# Patient Record
Sex: MALE | Race: WHITE | NOT HISPANIC OR LATINO | URBAN - METROPOLITAN AREA
[De-identification: names, ages, dates, MRNs, and addresses within clinical notes are randomized per-mention and may not be internally consistent; named-entity substitution may affect disease eponyms.]

---

## 2024-12-06 ENCOUNTER — OFFICE VISIT (OUTPATIENT)
Dept: URGENT CARE | Age: 44
End: 2024-12-06
Payer: COMMERCIAL

## 2024-12-06 VITALS
SYSTOLIC BLOOD PRESSURE: 112 MMHG | DIASTOLIC BLOOD PRESSURE: 75 MMHG | TEMPERATURE: 102.3 F | HEART RATE: 98 BPM | OXYGEN SATURATION: 96 %

## 2024-12-06 DIAGNOSIS — J18.9 PNEUMONIA OF RIGHT LOWER LOBE DUE TO INFECTIOUS ORGANISM: Primary | ICD-10-CM

## 2024-12-06 DIAGNOSIS — J18.9 PNEUMONIA OF RIGHT LOWER LOBE DUE TO INFECTIOUS ORGANISM: ICD-10-CM

## 2024-12-06 RX ORDER — AZITHROMYCIN 250 MG/1
TABLET, FILM COATED ORAL
Qty: 6 TABLET | Refills: 0 | Status: SHIPPED | OUTPATIENT
Start: 2024-12-06 | End: 2024-12-06

## 2024-12-06 RX ORDER — AZITHROMYCIN 250 MG/1
TABLET, FILM COATED ORAL
Qty: 6 TABLET | Refills: 0 | Status: SHIPPED | OUTPATIENT
Start: 2024-12-06 | End: 2024-12-11

## 2024-12-06 RX ORDER — CYCLOBENZAPRINE HCL 10 MG
10 TABLET ORAL 3 TIMES DAILY PRN
Qty: 30 TABLET | Refills: 0 | Status: SHIPPED | OUTPATIENT
Start: 2024-12-06 | End: 2024-12-06 | Stop reason: SDUPTHER

## 2024-12-06 RX ORDER — CYCLOBENZAPRINE HCL 10 MG
10 TABLET ORAL 3 TIMES DAILY PRN
Qty: 30 TABLET | Refills: 0 | Status: SHIPPED | OUTPATIENT
Start: 2024-12-06 | End: 2024-12-16

## 2024-12-06 RX ORDER — BENZONATATE 200 MG/1
200 CAPSULE ORAL 3 TIMES DAILY PRN
Qty: 42 CAPSULE | Refills: 0 | Status: SHIPPED | OUTPATIENT
Start: 2024-12-06 | End: 2025-01-05

## 2024-12-06 NOTE — PROGRESS NOTES
Subjective   Patient ID: Dennis Davis is a 44 y.o. male. They present today with a chief complaint of Cough (Fatigue , lower back an legs hurt).    History of Present Illness  43 yo male coming in for fatigue, lower back pain, leg aching, and cough. He states today he has a fever as well. He denies any ear pain or congestion. He denies any other complaints.     Past Medical History  Allergies as of 12/06/2024 - Reviewed 12/06/2024   Allergen Reaction Noted    Penicillins GI Upset 10/12/2012       (Not in a hospital admission)       History reviewed. No pertinent past medical history.    History reviewed. No pertinent surgical history.         Review of Systems  Review of Systems:  General: No weight loss, fatigue, anorexia, insomnia, positive fever, chills.  ENT: No pharyngitis, dry mouth, nasal congestion, ear pain  Cardiac: No chest pain, palpitations, syncope, near syncope.  Pulmonary:  No shortness of breath,  positive cough, no hemoptysis  Heme/lymph: No swollen glands, fever, bleeding  Musculoskeletal: No limb pain, joint pain, joint swelling.  Skin: No rashes  Neuro: No numbness, tingling, headaches                                 Objective    Vitals:    12/06/24 1303   BP: 112/75   Pulse: 98   Temp: (!) 39.1 °C (102.3 °F)   SpO2: 96%     No LMP for male patient.    Physical Exam  Physical Exam:  General: Vital noted, no distress. Afebrile  EENT: Eyes unremarkable, Pupils PERRLA, EOMs intact. TMs unremarkable. Posterior oropharynx unremarkable. Uvula in the midline and non-edematous. No PTA. No retropharyngeal mass. No Christofer's angina.  Cardiac: Regular rate and rhythm, no murmur  Pulmonary: Lungs with rhonchi in the right lower lobe, remainder of lung fields  clear bilaterally with good aeration. No adventitious breath sounds.  Skin: No rashes  Neuro: No focal neurologic deficits    Procedures    Point of Care Test & Imaging Results from this visit  No results found for this visit on 12/06/24.   No  results found.    Diagnostic study results (if any) were reviewed by DARIELA Crowell.    Assessment/Plan   Allergies, medications, history, and pertinent labs/EKGs/Imaging reviewed by DARIELA Crowell.     Medical Decision Making  Treatment: Zithromax, tessalon, and flexeril prescribed  Differential: 1) pneumonia, 2) bronchitis, 3) uri  Plan: Patient will follow up with the PCP in the next 2-3 days. Return for any worsening symptoms or go to the ER for further evaluation. Patient understands return precautions and discharge insturctions.  Impression:   1) pneumonia      Orders and Diagnoses  Diagnoses and all orders for this visit:  Pneumonia of right lower lobe due to infectious organism  -     azithromycin (Zithromax) 250 mg tablet; Take 2 tabs (500 mg) by mouth today, than 1 daily for 4 days.  -     benzonatate (Tessalon) 200 mg capsule; Take 1 capsule (200 mg) by mouth 3 times a day as needed for cough. Do not crush or chew.  -     cyclobenzaprine (Flexeril) 10 mg tablet; Take 1 tablet (10 mg) by mouth 3 times a day as needed for muscle spasms for up to 10 days.      Medical Admin Record      Patient disposition: Home    Electronically signed by DARIELA Crowell  1:27 PM

## 2025-06-11 ENCOUNTER — OFFICE VISIT (OUTPATIENT)
Dept: URGENT CARE | Age: 45
End: 2025-06-11
Payer: COMMERCIAL

## 2025-06-11 VITALS
SYSTOLIC BLOOD PRESSURE: 117 MMHG | OXYGEN SATURATION: 99 % | WEIGHT: 235 LBS | BODY MASS INDEX: 32.78 KG/M2 | TEMPERATURE: 98 F | DIASTOLIC BLOOD PRESSURE: 81 MMHG | RESPIRATION RATE: 18 BRPM | HEART RATE: 80 BPM

## 2025-06-11 DIAGNOSIS — H60.502 ACUTE OTITIS EXTERNA OF LEFT EAR, UNSPECIFIED TYPE: Primary | ICD-10-CM

## 2025-06-11 PROCEDURE — 1036F TOBACCO NON-USER: CPT

## 2025-06-11 PROCEDURE — 99213 OFFICE O/P EST LOW 20 MIN: CPT

## 2025-06-11 RX ORDER — CIPROFLOXACIN AND DEXAMETHASONE 3; 1 MG/ML; MG/ML
4 SUSPENSION/ DROPS AURICULAR (OTIC) 2 TIMES DAILY
Qty: 7.5 ML | Refills: 0 | Status: SHIPPED | OUTPATIENT
Start: 2025-06-11 | End: 2025-06-18

## 2025-06-11 ASSESSMENT — PATIENT HEALTH QUESTIONNAIRE - PHQ9
SUM OF ALL RESPONSES TO PHQ9 QUESTIONS 1 AND 2: 0
2. FEELING DOWN, DEPRESSED OR HOPELESS: NOT AT ALL
1. LITTLE INTEREST OR PLEASURE IN DOING THINGS: NOT AT ALL

## 2025-06-11 ASSESSMENT — ENCOUNTER SYMPTOMS: CONSTITUTIONAL NEGATIVE: 1

## 2025-06-11 NOTE — PROGRESS NOTES
Subjective   Patient ID: Dennis Davis is a 44 y.o. male. They present today with a chief complaint of Earache (Ear pain  since Friday).    History of Present Illness  44-year-old male presents to clinic today with complaints of left ear pain.  Patient states been ongoing for 3 to 4 days.  Patient states that he began wearing inner earplugs over the last 1 month because he is a .  He is unsure if it is related.  He has noted swelling to the ear.  The ear feels clogged.  Denies drainage.  Denies fevers.      Earache         Past Medical History  Allergies as of 06/11/2025 - Reviewed 06/11/2025   Allergen Reaction Noted    Penicillins GI Upset 10/12/2012       Prescriptions Prior to Admission[1]     Medical History[2]    Surgical History[3]     reports that he has never smoked. He has never used smokeless tobacco. He reports that he does not drink alcohol.    Review of Systems  Review of Systems   Constitutional: Negative.    HENT:  Positive for ear pain.                                   Objective    Vitals:    06/11/25 0858   BP: 117/81   Pulse: 80   Resp: 18   Temp: 36.7 °C (98 °F)   SpO2: 99%   Weight: 107 kg (235 lb)     No LMP for male patient.    Physical Exam  Constitutional:       Appearance: Normal appearance.   HENT:      Right Ear: Tympanic membrane and ear canal normal.      Left Ear: Swelling and tenderness present.   Neurological:      Mental Status: He is alert.         Procedures    Point of Care Test & Imaging Results from this visit  No results found for this visit on 06/11/25.   Imaging  No results found.    Cardiology, Vascular, and Other Imaging  No other imaging results found for the past 2 days      Diagnostic study results (if any) were reviewed by Uriel Hamilton PA-C.    Assessment/Plan   Allergies, medications, history, and pertinent labs/EKGs/Imaging reviewed by Uriel Hamilton PA-C.     Medical Decision Making  Patient pleasant cooperative on examination.    On examination the  left canal is swollen.  There is tenderness to tragus tug.    I am unable to visualize the TM.    This is concerning for a acute otitis externa.  This is most likely exacerbated by wearing earplugs.    I started patient on Ciprodex.  Advised him continue with ibuprofen.    Monitor for worsening or persistent signs.    Patient agreeable plan.  Patient alert and oriented, no acute distress upon discharge.    Orders and Diagnoses  Diagnoses and all orders for this visit:  Acute otitis externa of left ear, unspecified type  -     ciprofloxacin-dexamethasone (Ciprodex) otic suspension; Administer 4 drops into the left ear 2 times a day for 7 days.      Medical Admin Record      Patient disposition: Home    Electronically signed by Uriel Hamilton PA-C  9:47 AM           [1] (Not in a hospital admission)  [2] No past medical history on file.  [3] No past surgical history on file.